# Patient Record
Sex: MALE | Race: ASIAN | NOT HISPANIC OR LATINO | URBAN - METROPOLITAN AREA
[De-identification: names, ages, dates, MRNs, and addresses within clinical notes are randomized per-mention and may not be internally consistent; named-entity substitution may affect disease eponyms.]

---
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
## 2021-11-19 ENCOUNTER — EMERGENCY (EMERGENCY)
Facility: HOSPITAL | Age: 21
LOS: 1 days | Discharge: HOME | End: 2021-11-19
Attending: EMERGENCY MEDICINE | Admitting: EMERGENCY MEDICINE
Payer: MEDICAID

## 2021-11-19 VITALS
TEMPERATURE: 99 F | OXYGEN SATURATION: 100 % | RESPIRATION RATE: 18 BRPM | DIASTOLIC BLOOD PRESSURE: 93 MMHG | HEART RATE: 107 BPM | SYSTOLIC BLOOD PRESSURE: 148 MMHG

## 2021-11-19 VITALS
OXYGEN SATURATION: 100 % | HEART RATE: 83 BPM | DIASTOLIC BLOOD PRESSURE: 69 MMHG | TEMPERATURE: 98 F | SYSTOLIC BLOOD PRESSURE: 125 MMHG | RESPIRATION RATE: 17 BRPM

## 2021-11-19 PROCEDURE — 93010 ELECTROCARDIOGRAM REPORT: CPT

## 2021-11-19 PROCEDURE — 99285 EMERGENCY DEPT VISIT HI MDM: CPT | Mod: 25

## 2021-11-19 PROCEDURE — 71046 X-RAY EXAM CHEST 2 VIEWS: CPT | Mod: 26

## 2021-11-19 RX ORDER — IBUPROFEN 200 MG
600 TABLET ORAL ONCE
Refills: 0 | Status: COMPLETED | OUTPATIENT
Start: 2021-11-19 | End: 2021-11-19

## 2021-11-19 RX ADMIN — Medication 600 MILLIGRAM(S): at 23:27

## 2021-11-19 NOTE — ED ADULT TRIAGE NOTE - NS ED NURSE DIRECT TO ROOM YN
Aleksander Castillo, PT  LUISITO Gutierrez  Nurse Msg Pool             PT discipline discharge complete and patient in agreement with current POC.     Patient demonstrates good progress toward therapy goals.  At initial evaluation, patient was CGA to SBA for nearly all functional mobility with a FWW.  His movements were very slow, contributing to increased fall risk.  Currently, he is SBA to supervision for all functional mobility with intermittent use of AD.  He is able to walk short distances within the home without an AD and no evidence of balance loss.  He uses a FWW when getting out to the car or ambulating outdoors.  His movements are a slightly more pronounced with an improvement in TUG score of 12 seconds.  His Tinetti balance score improved by 7 points, indicating significant improvement in overall balance.  He does still remain a mild fall risk, especially when using stairs or ambulating on outdoor surfaces.  He is SBA to go up and down 4 steps with help from his spouse.  He denies any pain and feels though he has made good progress overall.  Patient will be D/C from skilled  PT services at this time and will continue with a comprehensive HEP to prevent a decline in function.      Routed to PCP as an FYI    No

## 2021-11-19 NOTE — ED PROVIDER NOTE - NSFOLLOWUPINSTRUCTIONS_ED_ALL_ED_FT
Use tylenol 650mg and mortin 600mg every 6 hours as needed for pain.    Follow up with your cardiologist.     Chest pain can be caused by many different conditions which may or may not be dangerous. Causes include heartburn, lung infections, heart attack, blood clot in lungs, skin infections, strain or damage to muscle, cartilage, or bones, etc. In addition to a history and physical examination, an electrocardiogram (ECG) or other lab tests may have been performed to determine the cause of your chest pain. Follow up with your primary care provider or with a cardiologist as instructed.     SEEK IMMEDIATE MEDICAL CARE IF YOU HAVE ANY OF THE FOLLOWING SYMPTOMS: worsening chest pain, coughing up blood, unexplained back/neck/jaw pain, severe abdominal pain, dizziness or lightheadedness, fainting, shortness of breath, sweaty or clammy skin, vomiting, or racing heart beat. These symptoms may represent a serious problem that is an emergency. Do not wait to see if the symptoms will go away. Get medical help right away. Call 911 and do not drive yourself to the hospital.

## 2021-11-19 NOTE — ED PROVIDER NOTE - ATTENDING CONTRIBUTION TO CARE
Attending Statement: I have personally seen and examined this patient. I have fully participated in the care of this patient. I have reviewed all pertinent clinical information, including history physical exam, plan and the Resident's note and agree except as noted  20yo M hx of PVC followed by cardiology pw chest pain x 3 days. Intermittent left sided chest pain, feels like prior PVC. Today felt "weirder" no associated n/v/d no SOB or NOYOLA no cough no fever. no abdominal pain. no leg swelling. non smoker no EOTH or drug use. no travel. Had an echo/stress and holter w cardiology a couple of months ago, dx w pvc.   Vital signs noted. well appearing male. normal S1-S2 No resp distress. able to speak in full and clear sentences. no wheeze, rales or stridor. soft nontender abdomen. no  rebound. no guarding. no sign of trauma. no CVAT no pedal edema. no calf tenderness. normal pulses bilateral feet.  plan labs, ekg, cxr, tele monitor.  PERC neg

## 2021-11-19 NOTE — ED ADULT TRIAGE NOTE - CHIEF COMPLAINT QUOTE
chest pain    epigastric pain for 2-3 day with dizziness, clammy hands.  pt has hx of pvc's.. on diltiazem.  gf- marysol [phone]

## 2021-11-19 NOTE — ED ADULT NURSE NOTE - OBJECTIVE STATEMENT
pt received to room 2 , a&ox 4 and ambulatory, pmh of PVCs on Diltiazem, p/w chest pain radiating to left arm since Wednesday. Chest pain non-constant per patient. Pain not relieved with meds. Pt verbalized epigastric pain. Abdomen soft, non-tender, non-distended with positive bowel sounds. Pt breathing even and unlabored on room air. NSR on cardiac monitor. Denies fever, chills, cough, SOB, palpitations, dizziness, N/V/D, constipation, numbness, tingling. MD orders pending. Stretcher in lowest position, wheels locked, appropriate side rails in place, call bell in reach.

## 2021-11-19 NOTE — ED PROVIDER NOTE - PHYSICAL EXAMINATION
GENERAL: Awake, alert, NAD, +obese  HEENT: NC/AT, moist mucous membranes  LUNGS: CTAB, no wheezes or crackles   CARDIAC: RRR, no m/r/g  ABDOMEN: Soft, normal BS, non tender, non distended, no rebound, no guarding  BACK: No midline spinal tenderness, no CVA tenderness  EXT: No edema, no calf tenderness, 2+ DP pulses bilaterally, no deformities.  NEURO: A&Ox3. Moving all extremities.  SKIN: Warm and dry. No rash.  PSYCH: Normal affect.

## 2021-11-19 NOTE — ED PROVIDER NOTE - NS ED ROS FT
CONST: no fevers, no chills  EYES: no pain, no vision changes  ENT: no sore throat, no ear pain, no change in hearing  CV: + chest pain, no leg swelling  RESP: no shortness of breath, no cough  ABD: no abdominal pain, no nausea, no vomiting, no diarrhea  : no dysuria, no flank pain, no hematuria  MSK: no back pain, + extremity pain  NEURO: no headache or additional neurologic complaints  HEME: no easy bleeding  SKIN:  no rash

## 2021-11-19 NOTE — ED PROVIDER NOTE - OBJECTIVE STATEMENT
20 y/o M with PMH PVCs (on diltiazem, follows with cardiology) presenting to the ED c/o chest pain. Reports intermittent for the past 2-3 days but was more consistent today with radiation into the left arm. He endorses having chest pain in the past, but states today felt different given the radiation and also feeling lightheaded. He denies SOB. No nausea, vomiting, fever, or chills. No family hx of heart disease or early death. Denies leg swelling, recent travel, immobility.

## 2021-11-19 NOTE — ED PROVIDER NOTE - PATIENT PORTAL LINK FT
You can access the FollowMyHealth Patient Portal offered by [facility] by registering at the following website: [email or web address]. By joining [employer]’s FollowMyHealth portal, you will also be able to view your health information using other applications (apps) compatible with our system.

## 2021-11-19 NOTE — ED ADULT NURSE NOTE - CHIEF COMPLAINT QUOTE
chest pain    epigastric pain for 2-3 day with dizziness, clammy hands.  pt has hx of pvc's.. on diltiazem.  gf- marysol [phone]

## 2021-11-19 NOTE — ED PROVIDER NOTE - CLINICAL SUMMARY MEDICAL DECISION MAKING FREE TEXT BOX
22 y/o M with PMH PVCs presenting to the ED w/ chest pain. Vitals stable upon arrival. Patient is well appearing in NAD. Given age and limited risk factors, low suspicion for ACS. However, patient with known PVCs/cardiac pathology so will obtain labs including cbc, cmp, trop, CXR, EKG. Low suspicion of PE given no risk factors and normal vitals. Dylan Knox, DO PGY3

## 2021-11-19 NOTE — ED PROVIDER NOTE - PROGRESS NOTE DETAILS
pending labs if neg dc w close cardiology follow up Labs and CXR within normal limits. Patient instructed to follow up with his cardiologist this week. Dylan Knox, DO PGY3

## 2021-11-20 LAB
ALBUMIN SERPL ELPH-MCNC: 4.6 G/DL — SIGNIFICANT CHANGE UP (ref 3.3–5)
ALP SERPL-CCNC: 91 U/L — SIGNIFICANT CHANGE UP (ref 40–120)
ALT FLD-CCNC: 24 U/L — SIGNIFICANT CHANGE UP (ref 4–41)
ANION GAP SERPL CALC-SCNC: 12 MMOL/L — SIGNIFICANT CHANGE UP (ref 7–14)
APTT BLD: 33.5 SEC — SIGNIFICANT CHANGE UP (ref 27–36.3)
AST SERPL-CCNC: 18 U/L — SIGNIFICANT CHANGE UP (ref 4–40)
BASOPHILS # BLD AUTO: 0.02 K/UL — SIGNIFICANT CHANGE UP (ref 0–0.2)
BASOPHILS NFR BLD AUTO: 0.2 % — SIGNIFICANT CHANGE UP (ref 0–2)
BILIRUB SERPL-MCNC: 0.3 MG/DL — SIGNIFICANT CHANGE UP (ref 0.2–1.2)
BUN SERPL-MCNC: 9 MG/DL — SIGNIFICANT CHANGE UP (ref 7–23)
CALCIUM SERPL-MCNC: 9.7 MG/DL — SIGNIFICANT CHANGE UP (ref 8.4–10.5)
CHLORIDE SERPL-SCNC: 103 MMOL/L — SIGNIFICANT CHANGE UP (ref 98–107)
CO2 SERPL-SCNC: 26 MMOL/L — SIGNIFICANT CHANGE UP (ref 22–31)
CREAT SERPL-MCNC: 0.8 MG/DL — SIGNIFICANT CHANGE UP (ref 0.5–1.3)
EOSINOPHIL # BLD AUTO: 0.18 K/UL — SIGNIFICANT CHANGE UP (ref 0–0.5)
EOSINOPHIL NFR BLD AUTO: 2.1 % — SIGNIFICANT CHANGE UP (ref 0–6)
GLUCOSE SERPL-MCNC: 104 MG/DL — HIGH (ref 70–99)
HCT VFR BLD CALC: 44.9 % — SIGNIFICANT CHANGE UP (ref 39–50)
HGB BLD-MCNC: 14.5 G/DL — SIGNIFICANT CHANGE UP (ref 13–17)
IANC: 5.49 K/UL — SIGNIFICANT CHANGE UP (ref 1.5–8.5)
IMM GRANULOCYTES NFR BLD AUTO: 0.2 % — SIGNIFICANT CHANGE UP (ref 0–1.5)
INR BLD: 1.12 RATIO — SIGNIFICANT CHANGE UP (ref 0.88–1.16)
LYMPHOCYTES # BLD AUTO: 2.03 K/UL — SIGNIFICANT CHANGE UP (ref 1–3.3)
LYMPHOCYTES # BLD AUTO: 23.5 % — SIGNIFICANT CHANGE UP (ref 13–44)
MCHC RBC-ENTMCNC: 25.8 PG — LOW (ref 27–34)
MCHC RBC-ENTMCNC: 32.3 GM/DL — SIGNIFICANT CHANGE UP (ref 32–36)
MCV RBC AUTO: 79.9 FL — LOW (ref 80–100)
MONOCYTES # BLD AUTO: 0.89 K/UL — SIGNIFICANT CHANGE UP (ref 0–0.9)
MONOCYTES NFR BLD AUTO: 10.3 % — SIGNIFICANT CHANGE UP (ref 2–14)
NEUTROPHILS # BLD AUTO: 5.49 K/UL — SIGNIFICANT CHANGE UP (ref 1.8–7.4)
NEUTROPHILS NFR BLD AUTO: 63.7 % — SIGNIFICANT CHANGE UP (ref 43–77)
NRBC # BLD: 0 /100 WBCS — SIGNIFICANT CHANGE UP
NRBC # FLD: 0 K/UL — SIGNIFICANT CHANGE UP
PLATELET # BLD AUTO: 229 K/UL — SIGNIFICANT CHANGE UP (ref 150–400)
POTASSIUM SERPL-MCNC: 3.8 MMOL/L — SIGNIFICANT CHANGE UP (ref 3.5–5.3)
POTASSIUM SERPL-SCNC: 3.8 MMOL/L — SIGNIFICANT CHANGE UP (ref 3.5–5.3)
PROT SERPL-MCNC: 7.9 G/DL — SIGNIFICANT CHANGE UP (ref 6–8.3)
PROTHROM AB SERPL-ACNC: 12.8 SEC — SIGNIFICANT CHANGE UP (ref 10.6–13.6)
RBC # BLD: 5.62 M/UL — SIGNIFICANT CHANGE UP (ref 4.2–5.8)
RBC # FLD: 12.4 % — SIGNIFICANT CHANGE UP (ref 10.3–14.5)
SODIUM SERPL-SCNC: 141 MMOL/L — SIGNIFICANT CHANGE UP (ref 135–145)
TROPONIN T, HIGH SENSITIVITY RESULT: <6 NG/L — SIGNIFICANT CHANGE UP
WBC # BLD: 8.63 K/UL — SIGNIFICANT CHANGE UP (ref 3.8–10.5)
WBC # FLD AUTO: 8.63 K/UL — SIGNIFICANT CHANGE UP (ref 3.8–10.5)

## 2021-11-20 RX ADMIN — Medication 600 MILLIGRAM(S): at 00:15

## 2022-08-01 ENCOUNTER — APPOINTMENT (OUTPATIENT)
Dept: PULMONOLOGY | Facility: CLINIC | Age: 22
End: 2022-08-01

## 2022-08-01 VITALS
BODY MASS INDEX: 37.92 KG/M2 | RESPIRATION RATE: 16 BRPM | SYSTOLIC BLOOD PRESSURE: 120 MMHG | WEIGHT: 305 LBS | HEART RATE: 75 BPM | TEMPERATURE: 98 F | HEIGHT: 75 IN | OXYGEN SATURATION: 99 % | DIASTOLIC BLOOD PRESSURE: 82 MMHG

## 2022-08-01 DIAGNOSIS — R06.83 SNORING: ICD-10-CM

## 2022-08-01 DIAGNOSIS — E66.9 OBESITY, UNSPECIFIED: ICD-10-CM

## 2022-08-01 PROCEDURE — 99203 OFFICE O/P NEW LOW 30 MIN: CPT

## 2022-08-01 RX ORDER — DILTIAZEM HYDROCHLORIDE 180 MG/1
180 CAPSULE, EXTENDED RELEASE ORAL
Qty: 30 | Refills: 0 | Status: ACTIVE | COMMUNITY
Start: 2022-05-19

## 2022-08-01 RX ORDER — FAMOTIDINE 40 MG/1
40 TABLET, FILM COATED ORAL
Qty: 30 | Refills: 0 | Status: DISCONTINUED | COMMUNITY
Start: 2022-03-01 | End: 2022-08-01

## 2022-08-01 RX ORDER — COVID-19 ANTIGEN TEST
KIT MISCELLANEOUS
Qty: 2 | Refills: 0 | Status: ACTIVE | COMMUNITY
Start: 2022-05-20

## 2022-08-01 RX ORDER — ONDANSETRON 4 MG/1
4 TABLET ORAL
Qty: 20 | Refills: 0 | Status: DISCONTINUED | COMMUNITY
Start: 2022-03-01 | End: 2022-08-01

## 2022-08-01 NOTE — ASSESSMENT
[FreeTextEntry1] : This is a 22 year old male with a history of mild obstructive sleep apnea here to reestablish care to be requalified for CPAP machine.. The patient has multiple signs and symptoms of sleep-disordered breathing include loud snoring and witnessed apneas in addition to morning headaches and daytime vertigo.. He was referred to the [facility] for a diagnostic HSAT. The ramifications of HAKAN and its potential therapeutic modalities were discussed with the patient. The patient was cautioned on the importance of avoiding drowsy driving. He will follow up with us after the HSAT.\par \par Was a patient is diagnosed with obstructive sleep apnea we will offer him either a home APAP study or order him just an APAP machine with a follow-up mask fitting.

## 2022-08-01 NOTE — PHYSICAL EXAM
[No Acute Distress] : no acute distress [Well Nourished] : well nourished [No Deformities] : no deformities [IV] : Mallampati Class: IV [2+] : Right Tonsil: 2+ [Normal Appearance] : normal appearance [Supple] : supple [No Neck Mass] : no neck mass [Normal Rate/Rhythm] : normal rate/rhythm [Normal S1, S2] : normal s1, s2 [No Murmurs] : no murmurs [No Resp Distress] : no resp distress [Clear to Auscultation Bilaterally] : clear to auscultation bilaterally [No Abnormalities] : no abnormalities [No Clubbing] : no clubbing [No Cyanosis] : no cyanosis [No Edema] : no edema [No Focal Deficits] : no focal deficits [Oriented x3] : oriented x3 [Normal Affect] : normal affect

## 2022-08-01 NOTE — HISTORY OF PRESENT ILLNESS
[Never] : never [Obstructive Sleep Apnea] : obstructive sleep apnea [TextBox_4] : This is a 22-year-old male with significant past medical history of sleep apnea and obesity who comes in to reestablish care with me.\par \par The patient was last seen in 2019 at this time for diagnosis of sleep apnea.  The patient was indicating that he was found to have mild obstructive sleep apnea and was given a CPAP machine.  He was unable to tolerate the CPAP machine due to a poor mask fitting.  However, he did not meet compliance requirements and his machine was to be taken away from him.  He received a DreamStation 1 and then it went under recall.  The patient was given a refurbished machine but he sent both machines back.  He continues to have loud snoring and witnessed apneas.  He continues to have morning headaches and daytime dizziness.  He is interested in requalifying for a new CPAP machine.\par \par ______________________________________________________________________________________ \par EPWORTH SLEEPINESS SCALE \par How likely are you to doze off or fall asleep in the situations described below, in contrast to feeling just tired? \par This refers to your usual way of life in recent times. \par Even if you haven't done some of these things recently, try to work out how they would have affected you. \par Use the following scale to choose one most appropriate number for each situation. \par \par 0 = Never would doze \par 1 = Slight chance of dozing \par 2 = Moderate chance of dozing \par 3 = High chance of dozing \par \par  \par 0........................................Sitting and reading \par 0........................................Watching TV \par 0........................................Sitting inactive in a public place (eg a theatre or a meeting) \par 0........................................As a passenger in a car for an hour without a break \par 0........................................Lying down to rest in the afternoon when circumstances permit \par 0........................................Sitting and talking to someone \par 0........................................Sitting quietly after lunch without alcohol \par 0........................................In a car, while stopped for a few minutes in traffic \par 0........................................TOTAL SCORE \par ______________________________________________________________________________________ \par \par  [TextBox_77] : 10pm [TextBox_79] : 8am [TextBox_81] : 20-30 min [TextBox_85] : 8 hours [TextBox_89] : 0-1 [ESS] : 0

## 2022-08-26 NOTE — ED PROVIDER NOTE - MEDICAL DECISION MAKING DETAILS
Sharona PGY2: 18M hx of prior PVCs last stress x3 years ago, no current cardiologist presents with a cc of L sided chest pain a/w radiation to L neck and jaw has had chest pain for months usually bearable however today noted worsening SP with SOB NOYOLA worse with exertion prompting ED visit, currently having CP in ED exam vss well appearing non toxic RBBB w/ PVC on ekg low c/f acs will rule out labs cxr cardiacs likely cdu for stress/echo
Spontaneous, unlabored and symmetrical

## 2022-09-06 ENCOUNTER — APPOINTMENT (OUTPATIENT)
Dept: SLEEP CENTER | Facility: CLINIC | Age: 22
End: 2022-09-06

## 2022-10-31 ENCOUNTER — OUTPATIENT (OUTPATIENT)
Dept: OUTPATIENT SERVICES | Facility: HOSPITAL | Age: 22
LOS: 1 days | End: 2022-10-31
Payer: MEDICAID

## 2022-10-31 ENCOUNTER — APPOINTMENT (OUTPATIENT)
Dept: SLEEP CENTER | Facility: CLINIC | Age: 22
End: 2022-10-31

## 2022-10-31 PROCEDURE — 95806 SLEEP STUDY UNATT&RESP EFFT: CPT | Mod: 26

## 2022-10-31 PROCEDURE — 95806 SLEEP STUDY UNATT&RESP EFFT: CPT

## 2022-11-11 ENCOUNTER — APPOINTMENT (OUTPATIENT)
Dept: PULMONOLOGY | Facility: CLINIC | Age: 22
End: 2022-11-11

## 2022-11-11 DIAGNOSIS — G47.33 OBSTRUCTIVE SLEEP APNEA (ADULT) (PEDIATRIC): ICD-10-CM

## 2022-11-11 PROCEDURE — 99213 OFFICE O/P EST LOW 20 MIN: CPT | Mod: 95

## 2022-11-14 PROBLEM — G47.33 OBSTRUCTIVE SLEEP APNEA, ADULT: Status: ACTIVE | Noted: 2022-08-01

## 2022-11-14 NOTE — PHYSICAL EXAM
[IV] : Mallampati Class: IV [2+] : Right Tonsil: 2+ [Normal Appearance] : normal appearance [Supple] : supple [No Neck Mass] : no neck mass [Normal Rate/Rhythm] : normal rate/rhythm [Normal S1, S2] : normal s1, s2 [No Murmurs] : no murmurs [No Resp Distress] : no resp distress [Clear to Auscultation Bilaterally] : clear to auscultation bilaterally [No Abnormalities] : no abnormalities [No Clubbing] : no clubbing [No Cyanosis] : no cyanosis [No Edema] : no edema [No Focal Deficits] : no focal deficits [No Acute Distress] : no acute distress [Well Nourished] : well nourished [No Deformities] : no deformities [Oriented x3] : oriented x3 [Normal Mood] : normal mood [Normal Affect] : normal affect

## 2022-11-14 NOTE — ASSESSMENT
[FreeTextEntry1] : This is a 22 year old male with a history of mild obstructive sleep apnea here to reestablish care to be re qualified for CPAP machine. \par \par #Obstructive sleep apnea–patient recently underwent a home diagnostic sleep study and was found to have no significant sleep disordered breathing with an AHI of 1.8.  Patient was having difficulty tolerating using PAP device previously.  Given that he had mild obstructive sleep apnea in the past and currently does not register as having any obstructive sleep apnea we will hold off on any further treatment at this time.  Note the patient's sleep disordered breathing symptoms become worse can always retest him in an in lab polysomnography to determine the true value of sleep disordered breathing.\par \par Patient will follow up with me as needed.

## 2022-11-14 NOTE — HISTORY OF PRESENT ILLNESS
[Never] : never [Obstructive Sleep Apnea] : obstructive sleep apnea [Home] : at home, [unfilled] , at the time of the visit. [Medical Office: ([facility])___] : at the medical office located in  [Verbal consent obtained from patient] : the patient, [unfilled] [TextBox_4] : \par \par This is a 22-year-old male with significant past medical history of sleep apnea and obesity who comes in for follow-up.  He underwent a home diagnostic sleep study and is here to review his results.\par \par Of note:\par The patient was last seen in 2019 at this time for diagnosis of sleep apnea.  The patient was indicating that he was found to have mild obstructive sleep apnea and was given a CPAP machine.  He was unable to tolerate the CPAP machine due to a poor mask fitting.  However, he did not meet compliance requirements and his machine was to be taken away from him.  He received a DreamStation 1 and then it went under recall.  The patient was given a refurbished machine but he sent both machines back.  He continues to have loud snoring and witnessed apneas.  He continues to have morning headaches and daytime dizziness.  He is interested in requalifying for a new CPAP machine.\par \par ______________________________________________________________________________________ \par EPWORTH SLEEPINESS SCALE \par How likely are you to doze off or fall asleep in the situations described below, in contrast to feeling just tired? \par This refers to your usual way of life in recent times. \par Even if you haven't done some of these things recently, try to work out how they would have affected you. \par Use the following scale to choose one most appropriate number for each situation. \par \par 0 = Never would doze \par 1 = Slight chance of dozing \par 2 = Moderate chance of dozing \par 3 = High chance of dozing \par \par  \par 0........................................Sitting and reading \par 0........................................Watching TV \par 0........................................Sitting inactive in a public place (eg a theatre or a meeting) \par 0........................................As a passenger in a car for an hour without a break \par 0........................................Lying down to rest in the afternoon when circumstances permit \par 0........................................Sitting and talking to someone \par 0........................................Sitting quietly after lunch without alcohol \par 0........................................In a car, while stopped for a few minutes in traffic \par 0........................................TOTAL SCORE \par ______________________________________________________________________________________ \par \par  [TextBox_77] : 10pm [TextBox_79] : 8am [TextBox_81] : 20-30 min [TextBox_85] : 8 hours [TextBox_89] : 0-1 [ESS] : 0

## 2022-12-02 DIAGNOSIS — G47.33 OBSTRUCTIVE SLEEP APNEA (ADULT) (PEDIATRIC): ICD-10-CM

## 2023-05-11 ENCOUNTER — EMERGENCY (EMERGENCY)
Facility: HOSPITAL | Age: 23
LOS: 1 days | Discharge: HOME | End: 2023-05-11
Attending: EMERGENCY MEDICINE | Admitting: EMERGENCY MEDICINE
Payer: MEDICAID

## 2023-05-11 VITALS
HEART RATE: 80 BPM | OXYGEN SATURATION: 100 % | SYSTOLIC BLOOD PRESSURE: 146 MMHG | DIASTOLIC BLOOD PRESSURE: 100 MMHG | RESPIRATION RATE: 18 BRPM | TEMPERATURE: 98 F

## 2023-05-11 LAB
FLUAV AG NPH QL: SIGNIFICANT CHANGE UP
FLUBV AG NPH QL: SIGNIFICANT CHANGE UP
RSV RNA NPH QL NAA+NON-PROBE: SIGNIFICANT CHANGE UP
SARS-COV-2 RNA SPEC QL NAA+PROBE: SIGNIFICANT CHANGE UP

## 2023-05-11 PROCEDURE — 99284 EMERGENCY DEPT VISIT MOD MDM: CPT

## 2023-05-11 PROCEDURE — 93010 ELECTROCARDIOGRAM REPORT: CPT

## 2023-05-11 RX ORDER — HYDROCODONE BITARTRATE AND HOMATROPINE METHYLBROMIDE 5; 1.5 MG/5ML; MG/5ML
5 SOLUTION ORAL
Qty: 140 | Refills: 0
Start: 2023-05-11 | End: 2023-05-17

## 2023-05-11 RX ORDER — HYDROCODONE BITARTRATE AND HOMATROPINE METHYLBROMIDE 5; 1.5 MG/5ML; MG/5ML
5 SOLUTION ORAL
Qty: 140 | Refills: 0
Start: 2023-05-11 | End: 2023-05-19

## 2023-05-11 NOTE — ED PROVIDER NOTE - NS ED MD DISPO DISCHARGE
Patient:   CRISPIN MCGRATH            MRN: [id number]            FIN: [id number]              Age:   72 years     Sex:  MALE     :  47   Associated Diagnoses:   None   Author:   GILBERTO MARKS     Chief Complaint   nsvt     History of Present Illness   The patient is a 72 year old male with past medical history of CAD s/p CABG, COPD on 5L NC, Diabetes, BPH, Atrial fibrillation, hx of surgical atrial appendage ligation, PVD, HTN and HLD  was admitted due to worsening drainage from the lumbar incision.  Patient was admitted in April with COVID pneumonia and then readmitted in May with dysuria, at that time found to have bacteremia growing bacteroides fragilis and with associated back pain and lower extremity weakness found to have L4-L5 discitis/osteomyelitis with associated epidural abscess. Underwent hemilaminotomy, medial facetectomy, diskectomy and evacuation of epidural abscess and disk space on 20. Patient was discharged to [facility] on   on IV abx through PICC line on presently receiving meropenem and vancomycin. Per the wife was notified yesterday by the rehab facility about increased drainage from the surgical site and therefore patient was transferred to the hospital. Overnight, was noted to have a brief episode of NSVT and cardiology was consulted. Patient denies palpitations or chest pain.      Review of Systems   Constitutional:  Negative.    Endocrine:  Negative.    Eye:  Negative.    Ear/Nose/Mouth/Throat:  Negative.    Respiratory:  Negative.    Cardiovascular:  Negative except as documented in history of present illness.   Gastrointestinal:  Negative.    Genitourinary:  Negative.    Hematology/Lymphatics:  Negative.    Musculoskeletal:  Negative.    Integumentary:  Negative except as documented in history of present illness.   Neurologic:  Negative.      Histories   Past Med History: Arthritis  Atrial fibrillation  CHF - Congestive heart failure  CKD (chronic kidney disease)  COPD  (Chronic Obstructive Pulmonary Disease) Assessment Test scale  COPD - Chronic obstructive pulmonary disease  Chronic pain  Congestive heart failure (CHF)  Diabetes  Diabetes mellitus  Hearing problem  Hearing problem  Hyperlipidemia  Hypertension  Hypertension  PVD (peripheral vascular disease)  Risk factors for obstructive sleep apnea  Smoker      Family History: MOTHER: Cataract      Procedure History:    Amputation of toe and metatarsal (SNOMED CT 2012334435).  Appendectomy (SNOMED CT 850117255).  CABG x 3 - Coronary artery bypass grafts x 3 (SNOMED CT 841647201).  Social History       Alcohol  Details: Alcohol Abuse in Household: No.  Use: None.  Details: Use: None.  Exercise  Details: Exercise: Never.  Details: Excercise: Never.  Home/Environment  Details: Alcohol Abuse in Household: No.  Substance Abuse in Household: No.  Smoker in Household: Yes.  Patient Lives With: Spouse.  Living Situation: Lives With Spouse.  Ambulation: Required Assistance.  Bathing: Required Assistance.  Dressing: Independent.  Driving: Not Performed.  Eating: Independent.  Elimination: Independent.  Grooming: Independent.  Preparing Meal: Independent.  Taking Meds: Independent.  Toileting: Independent.   Transfers: Independent.  Assistive Devices Home: Walker.  Rehab Consulted No.  Substance Abuse  Details: Substance Abuse in Household: No.  Use: None.  Details: Use: None.  Tobacco  Details: Smoker in Houshold: No.  Smoked/Smokeless Tobacco Last 30 Days: No.  Smoking Tobacco Use: Former smoker.  Smokeless Tobacco Use Former smokeless tobacco user, quit more than 30 days ago.  Details: Smoked/Smokeless Tobacco Last 30 Days: Yes.  Use: Current every day smoker.  Type: Cigarettes.  Cigarette Packs/Day: 5 or More Cigarettes <1 Pack Per Day.  Cultural/Moravian Practices  Details: Moravian or Cultural Practices: None.  Details: Moravian or Cultural Practices While in Hospital: No.  .       Health Status   Allergies: Allergies (ST)    Allergies (1) Active Reaction  Glimepiride-Pioglitazone None Documented  Hydrochloride    Current medications:    Medications (27) Active  Scheduled: (12)  Albuterol HFA 90 mcg oral MDI 8 gm  180 mcg 2 puff, MDI/DPI, Q4H Awake x4  Ascorbic acid 500 mg tab  500 mg 1 tab, Oral, Daily  Aspirin 81 mg DR tab  81 mg 1 tab, Oral, Daily  Ergocalciferol 50,000 unit (1.25 mg) cap  50,000 unit 1 cap, Oral, Q Thursday  Finasteride 5 mg tab  5 mg 1 tab, Oral, Daily  Heparin 5,000 unit/1 mL inj MDV  5,000 unit 1 mL, Subcutaneous, Q8H  Insulin human lispro 1 unit/0.01 mL inj  1-6 units, Subcutaneous, QID [with meals & HS]  meropenem [RESTRICTED]  500 mg, IVPB, Q6H  Pravastatin 40 mg tab  40 mg 1 tab, Oral, Q Bedtime  Sodium chloride PF 0.9% flush inj 10 mL  10 mL, Flush, Q12H  Thiamine 100 mg tab  100 mg 1 tab, Oral, Daily  Vancomycin 1,000 mg inj  Pharmacy serial dosing, N/A, As Directed PRN  Continuous: (0)  PRN: (15)  Acetaminophen 325 mg tab  650 mg 2 tab, Oral, Q4H  Acetaminophen 500 mg tab  500 mg 1 tab, Oral, Q4H  Albuterol HFA 90 mcg oral MDI 8 gm  180 mcg 2 puff, MDI/DPI, Q6H  Dextrose (glucose) 40% 15 gm/37.5 gm oral gel UD  15 gm, Oral, As Directed PRN  Dextrose (glucose) 50% 25 gm/50 mL syringe  12.5 gm 25 mL, IV Push, As Directed PRN  Dicyclomine 10 mg cap  10 mg 1 cap, Oral, QID [before meals & HS]  Glucagon 1 mg/1 mL emergency kit SDV  1 mg 1 mL, IM, As Directed PRN  LORazepam 1 mg tab  1 mg 1 tab, Oral, On Call  Melatonin 3 mg tab  3 mg 1 tab, Oral, Q Bedtime  Ondansetron 4 mg/2 mL inj SDV  4 mg 2 mL, Slow IV Push, Q6H  OxyCODONE 5 mg IR tab  5 mg 1 tab, Oral, Q4H  Senna-docusate sodium 8.6-50 mg tab  1 tab, Oral, Q Bedtime  Simethicone 125 mg chew tab  125 mg 1 tab, Chewed, TID [with meals]  Sodium chloride PF 0.9% flush inj 10 mL  10 mL, Flush, As Directed PRN  Sodium chloride PF 0.9% flush inj 10 mL  20 mL, Flush, As Directed PRN      Physical Examination   VS/Measurements     Vitals between:   20-JUN-2020  12:50:38   TO   21-JUN-2020 12:50:38                   LAST RESULT MINIMUM MAXIMUM  Temperature 36.6 36.4 36.6  Heart Rate 59 49 59  Respiratory Rate 18 16 18  NISBP           133 94 133  NIDBP           67 49 67  SpO2                    93 93 95    General:  Alert and oriented, No acute distress.    Eye:  Extraocular movements are intact.    HENT:  No pharyngeal erythema, hard of hearing.    Neck:  Supple, No jugular venous distention, No lymphadenopathy.   Respiratory:  Lungs are clear to auscultation.    Cardiovascular:  Normal rate, Irregularly irregular rhythm, No murmur, No edema.   Gastrointestinal:  Soft, Non-tender, Non-distended, Normal bowel sounds.   Musculoskeletal:  No tenderness.    Integumentary:  Warm, Dry.    Neurologic:  No focal deficits.      Review / Management   Laboratory results:     Labs between:  20-JUN-2020 12:50 to 21-JUN-2020 12:50  CBC:                 WBC  HgB  Hct  Plt  MCV  RDW   21-JUN-2020 10.8  (L) 8.0  (L) 26.2  174  98.9  (H) 19.8   DIFF:                 Seg  Neutroph//ABS  Lymph//ABS  Mono//ABS  EOS/ABS  21-JUN-2020 NOT APPLICABLE  81 // (H) 8.8  6 // (L) 0.6  8 // 0.8 3 // 0.3  BMP:                 Na  Cl  BUN  Glu   21-JUN-2020 142  106  14  96                              K  CO2  Cr  Ca                              3.9  (H) 33  0.72  (L) 7.7   CMP:                 AST  ALT  AlkPhos  Bili  Albumin   21-JUN-2020 13  11  110  (H) 1.3  (L) 1.8   Other Chem:             Mg  Phos  Triglycerides  GGTP  DirectBili                           2.0           POC GLU:                 Latest Result  Latest Date  Minimum  Min Date  Maximum  Max Date                             96 21-JUN-2020 85 21-JUN-2020 (H) 101  20-JUN-2020  Drug Levels:                           Vancomycin                   Trough: (!) 25.5                               ,   Cardiovascular Labs    NT proBNP 8877 pg/mL (06/20/20 05:30)    .    Radiology results   chest xray 6/19:  Impression:  Right PICC line tip  terminates at the cavoatrial junction.  Findings suggestive of pulmonary edema.  Superimposed infection is not excluded, particular in the left lower lobe there is a more focal opacity.  Result title:  MRI LUMBAR SPINE WO CON  Result status:  Final  Verified by:  RICARDO EM on 06/21/2020 07:13  IMPRESSION:1.  Limited evaluation, as study was terminated prematurely due to patient pain and anxiety.2.  Fluid signal intensity in the L2-L3 intervertebral disc space with adjacent irregularity of the inferior L2 and superior L3 endplates may reflect discitis osteomyelitis.3.  Fluid signal intensity distending the L4-L5 intervertebral disc space up to 2.5 cm is worrisome for intervertebral disc abscess.  This appears to extend into the left  L4-L5 disc facet joint and is contiguous with posterior paraspinal edema.  echo 6/16:  1. Left ventricle: The cavity size is mildly dilated. Wall thickness is    mildly increased. Systolic function is normal. The estimated ejection    fraction is 55-60%.  2. Right ventricle: The cavity size is mildly to moderately dilated. Wall    thickness is normal. Systolic pressure is moderately to markedly    increased. The estimated peak pressure is 68mm Hg.   ,       ECG interp:  (visualized and independently interpreted): afib, 63 bpm, IVCD.   Documentation reviewed:  Reviewed prior records.      Impression and Plan   nsvt - patient with very short run of nsvt, asymptomatic  - suspect it may have been precipitated by beta agonist bronchodilators  - recent echow ith normal ef  - patient with known CAD, hx of cabg  - unable to tolerate beta blockers due to copd and bradycardia  - consider switching bronchodilators to PRN if possible  - maintain K > 4.0, Mg > 2.0 with diuresis  acute on chronic diastolic heart failure - bnp markedly elevated, pulmonary edema noted on chest xray  - start lasix 40 mg iv bid  - monitor intake/output  - closely monitor renal function, electrolytes with  diuresis  permanent afib - rate controlled  - has been on warfarin in the past. should restart if no contraindication  cad - hx of cabg  - continue asa, statin  - no beta blocker due to copd and bradycardia  Deandre Kay MD   Home

## 2023-05-11 NOTE — ED ADULT NURSE NOTE - OBJECTIVE STATEMENT
Pt c/o sore throat, cough and chest pain after coughing X 3 days. Diagnosed with tonsilitis on 5/8, started on Amoxicillin. Denies chest pain, sob, abd pain, n/v/d, fevers/chills. Pmhx: PVC's, GERD.  PT A&OX4.  No distress noted.  Breathing non-labored.  PT ambulatory, able to move all extremities.  Denies pain/ discomfort at this time.  Will continue to monitor.

## 2023-05-11 NOTE — ED PROVIDER NOTE - CLINICAL SUMMARY MEDICAL DECISION MAKING FREE TEXT BOX
[age]-year-old male with a history of PVCs presents with 4 days of cough, congestion, tonsillitis.  Patient was prescribed amoxicillin on Monday for tonsillitis but his cough has progressed.  Likely viral syndrome.  Flu with COVID sent.  Prescription for Hycodan and Tessalon Perles sent to patient's pharmacy.  Dispo to home with PMD follow-up.

## 2023-05-11 NOTE — ED PROVIDER NOTE - NSICDXPASTMEDICALHX_GEN_ALL_CORE_FT
PAST MEDICAL HISTORY:  GERD (gastroesophageal reflux disease)     PVC (premature ventricular contraction)

## 2023-05-11 NOTE — ED PROVIDER NOTE - ATTENDING CONTRIBUTION TO CARE
Dr. Osorio:  I have personally performed a face to face bedside history and physical examination of this patient. I have discussed the history, examination, review of systems, assessment and plan of management with the resident. I have reviewed the electronic medical record and amended it to reflect my history, review of systems, physical exam, assessment and plan.    22M h/o PVCs presents with 4 days of cough, congestion.  Was diagnosed with tonsilitis a few days ago and started on amoxicillin; sore throat improved but cough worsening.  ROS otherwise negative.    Exam:  - nad  - rrr  - ctab   -abd soft ntnd    A/P  - likely viral syndrome  - recommend supportive care, f/u PCP

## 2023-05-11 NOTE — ED PROVIDER NOTE - PATIENT PORTAL LINK FT
You can access the FollowMyHealth Patient Portal offered by [facility] by registering at the following website: [email or web address]. By joining [facility]’s FollowMyHealth portal, you will also be able to view your health information using other applications (apps) compatible with our system.

## 2023-05-11 NOTE — ED POST DISCHARGE NOTE - ADDITIONAL DOCUMENTATION
Pritesh PGY-3: patient called asking for viral swab results. Informed of negative flu/covid/rsv swab. Also states that his codein rx to pharmacy was not able to be filled. Recommended that patient try tessalon perles that were also rx'd as well as salt water gargles and honey as supportive care. PMD f/u.

## 2023-05-11 NOTE — ED ADULT NURSE NOTE - NSFALLUNIVINTERV_ED_ALL_ED
Bed/Stretcher in lowest position, wheels locked, appropriate side rails in place/Call bell, personal items and telephone in reach/Instruct patient to call for assistance before getting out of bed/chair/stretcher/Non-slip footwear applied when patient is off stretcher/[place] to call system/Physically safe environment - no spills, clutter or unnecessary equipment/Purposeful proactive rounding/Room/bathroom lighting operational, light cord in reach

## 2023-05-11 NOTE — ED PROVIDER NOTE - NSFOLLOWUPINSTRUCTIONS_ED_ALL_ED_FT
Please use Tessalon Perles 3 times a day for your cough symptoms.  Please use Hycodan syrup at nighttime for cough and to help you sleep.  The Hycodan syrup will make you sleepy so please do not operate any motor vehicles or heavy machinery while taking this medication.    Please follow up with your primary care physician within 1 week of discharge from the emergency department.  Please bring a copy of your results with you.  Please return to the emergency department for worsening of your symptoms.    You may take Acetaminophen over the counter as needed for pain and/or fever. Use as directed and see medication warnings.  You may take Ibuprofen over the counter as needed for pain and/or fever. Use as directed and see medication warnings.    DISCHARGE INSTRUCTIONS:  Call your local emergency number (911 in the [place]) or have someone else call if:  You have a seizure.  You cannot be woken.  You have chest pain or trouble breathing.    Seek care immediately if:  You have a stiff neck, a bad headache, and sensitivity to light.  You feel weak, dizzy, or confused.  You stop urinating or urinate a lot less than usual.  You cough up blood or thick yellow or green mucus.  You have severe abdominal pain or your abdomen is larger than usual.    Call your primary doctor if:  Your symptoms get worse after 3 days.  You have a rash or ear pain.  You have burning when you urinate.  You have questions or concerns about your condition or care.

## 2023-05-11 NOTE — ED PROVIDER NOTE - PHYSICAL EXAMINATION
GENERAL: well appearing in no acute distress, non-toxic appearing  HEAD: normocephalic, atraumatic  HEENT: normal conjunctiva, oral mucosa moist, uvula midline, no tonsilar exudates  CARDIAC: regular rate and rhythm, normal S1S2, no appreciable murmurs  PULM: normal breath sounds, clear to ascultation bilaterally, no rales, rhonchi, wheezing  GI: abdomen nondistended, soft, nontender, no guarding, rebound tenderness  : no CVA tenderness b/l, no suprapubic tenderness  NEURO: moving all 4 extremities, no focal deficits, normal speech, AAOx3   MSK: no peripheral edema, no calf tenderness b/l  SKIN: well-perfused, extremities warm, no visible rashes  PSYCH: appropriate mood and affect

## 2023-05-11 NOTE — ED ADULT TRIAGE NOTE - CHIEF COMPLAINT QUOTE
Pt c/o sore throat, cough and chest pain after coughing X 3 days. Diagnosed with tonsilitis on 5/8, started on Amoxicillin. Denies chest pain, sob, abd pain, n/v/d, fevers/chills. Pmhx: PVC's, GERD

## 2023-08-10 ENCOUNTER — EMERGENCY (EMERGENCY)
Facility: HOSPITAL | Age: 23
LOS: 1 days | Discharge: HOME | End: 2023-08-10
Attending: STUDENT IN AN ORGANIZED HEALTH CARE EDUCATION/TRAINING PROGRAM | Admitting: STUDENT IN AN ORGANIZED HEALTH CARE EDUCATION/TRAINING PROGRAM
Payer: MEDICAID

## 2023-08-10 VITALS
HEART RATE: 100 BPM | TEMPERATURE: 99 F | DIASTOLIC BLOOD PRESSURE: 85 MMHG | OXYGEN SATURATION: 100 % | RESPIRATION RATE: 20 BRPM | SYSTOLIC BLOOD PRESSURE: 129 MMHG

## 2023-08-10 LAB
ALBUMIN SERPL ELPH-MCNC: 4.2 G/DL — SIGNIFICANT CHANGE UP (ref 3.3–5)
ALP SERPL-CCNC: 76 U/L — SIGNIFICANT CHANGE UP (ref 40–120)
ALT FLD-CCNC: 19 U/L — SIGNIFICANT CHANGE UP (ref 4–41)
ANION GAP SERPL CALC-SCNC: 13 MMOL/L — SIGNIFICANT CHANGE UP (ref 7–14)
AST SERPL-CCNC: 21 U/L — SIGNIFICANT CHANGE UP (ref 4–40)
BASOPHILS # BLD AUTO: 0.01 K/UL — SIGNIFICANT CHANGE UP (ref 0–0.2)
BASOPHILS NFR BLD AUTO: 0.1 % — SIGNIFICANT CHANGE UP (ref 0–2)
BILIRUB SERPL-MCNC: 0.5 MG/DL — SIGNIFICANT CHANGE UP (ref 0.2–1.2)
BUN SERPL-MCNC: 9 MG/DL — SIGNIFICANT CHANGE UP (ref 7–23)
CALCIUM SERPL-MCNC: 9.1 MG/DL — SIGNIFICANT CHANGE UP (ref 8.4–10.5)
CHLORIDE SERPL-SCNC: 101 MMOL/L — SIGNIFICANT CHANGE UP (ref 98–107)
CO2 SERPL-SCNC: 22 MMOL/L — SIGNIFICANT CHANGE UP (ref 22–31)
CREAT SERPL-MCNC: 0.92 MG/DL — SIGNIFICANT CHANGE UP (ref 0.5–1.3)
EGFR: 120 ML/MIN/1.73M2 — SIGNIFICANT CHANGE UP
EOSINOPHIL # BLD AUTO: 0.04 K/UL — SIGNIFICANT CHANGE UP (ref 0–0.5)
EOSINOPHIL NFR BLD AUTO: 0.4 % — SIGNIFICANT CHANGE UP (ref 0–6)
GLUCOSE SERPL-MCNC: 110 MG/DL — HIGH (ref 70–99)
HCT VFR BLD CALC: 46.1 % — SIGNIFICANT CHANGE UP (ref 39–50)
HGB BLD-MCNC: 15.2 G/DL — SIGNIFICANT CHANGE UP (ref 13–17)
IANC: 6.57 K/UL — SIGNIFICANT CHANGE UP (ref 1.8–7.4)
IMM GRANULOCYTES NFR BLD AUTO: 0.2 % — SIGNIFICANT CHANGE UP (ref 0–0.9)
LYMPHOCYTES # BLD AUTO: 1.71 K/UL — SIGNIFICANT CHANGE UP (ref 1–3.3)
LYMPHOCYTES # BLD AUTO: 18.1 % — SIGNIFICANT CHANGE UP (ref 13–44)
MAGNESIUM SERPL-MCNC: 2.3 MG/DL — SIGNIFICANT CHANGE UP (ref 1.6–2.6)
MCHC RBC-ENTMCNC: 26.9 PG — LOW (ref 27–34)
MCHC RBC-ENTMCNC: 33 GM/DL — SIGNIFICANT CHANGE UP (ref 32–36)
MCV RBC AUTO: 81.4 FL — SIGNIFICANT CHANGE UP (ref 80–100)
MONOCYTES # BLD AUTO: 1.09 K/UL — HIGH (ref 0–0.9)
MONOCYTES NFR BLD AUTO: 11.5 % — SIGNIFICANT CHANGE UP (ref 2–14)
NEUTROPHILS # BLD AUTO: 6.57 K/UL — SIGNIFICANT CHANGE UP (ref 1.8–7.4)
NEUTROPHILS NFR BLD AUTO: 69.7 % — SIGNIFICANT CHANGE UP (ref 43–77)
NRBC # BLD: 0 /100 WBCS — SIGNIFICANT CHANGE UP (ref 0–0)
NRBC # FLD: 0 K/UL — SIGNIFICANT CHANGE UP (ref 0–0)
PHOSPHATE SERPL-MCNC: 3.2 MG/DL — SIGNIFICANT CHANGE UP (ref 2.5–4.5)
PLATELET # BLD AUTO: 218 K/UL — SIGNIFICANT CHANGE UP (ref 150–400)
POTASSIUM SERPL-MCNC: 4 MMOL/L — SIGNIFICANT CHANGE UP (ref 3.5–5.3)
POTASSIUM SERPL-SCNC: 4 MMOL/L — SIGNIFICANT CHANGE UP (ref 3.5–5.3)
PROT SERPL-MCNC: 7.8 G/DL — SIGNIFICANT CHANGE UP (ref 6–8.3)
RBC # BLD: 5.66 M/UL — SIGNIFICANT CHANGE UP (ref 4.2–5.8)
RBC # FLD: 12.5 % — SIGNIFICANT CHANGE UP (ref 10.3–14.5)
SODIUM SERPL-SCNC: 136 MMOL/L — SIGNIFICANT CHANGE UP (ref 135–145)
TSH SERPL-MCNC: 1.3 UIU/ML — SIGNIFICANT CHANGE UP (ref 0.27–4.2)
WBC # BLD: 9.44 K/UL — SIGNIFICANT CHANGE UP (ref 3.8–10.5)
WBC # FLD AUTO: 9.44 K/UL — SIGNIFICANT CHANGE UP (ref 3.8–10.5)

## 2023-08-10 PROCEDURE — 93010 ELECTROCARDIOGRAM REPORT: CPT

## 2023-08-10 PROCEDURE — 99284 EMERGENCY DEPT VISIT MOD MDM: CPT

## 2023-08-10 RX ORDER — SODIUM CHLORIDE 9 MG/ML
1000 INJECTION INTRAMUSCULAR; INTRAVENOUS; SUBCUTANEOUS ONCE
Refills: 0 | Status: COMPLETED | OUTPATIENT
Start: 2023-08-10 | End: 2023-08-10

## 2023-08-10 RX ORDER — ONDANSETRON 8 MG/1
1 TABLET, FILM COATED ORAL
Qty: 1 | Refills: 0
Start: 2023-08-10

## 2023-08-10 RX ORDER — KETOROLAC TROMETHAMINE 30 MG/ML
15 SYRINGE (ML) INJECTION ONCE
Refills: 0 | Status: DISCONTINUED | OUTPATIENT
Start: 2023-08-10 | End: 2023-08-10

## 2023-08-10 RX ORDER — FAMOTIDINE 10 MG/ML
20 INJECTION INTRAVENOUS ONCE
Refills: 0 | Status: COMPLETED | OUTPATIENT
Start: 2023-08-10 | End: 2023-08-10

## 2023-08-10 RX ORDER — ONDANSETRON 8 MG/1
4 TABLET, FILM COATED ORAL ONCE
Refills: 0 | Status: COMPLETED | OUTPATIENT
Start: 2023-08-10 | End: 2023-08-10

## 2023-08-10 RX ADMIN — FAMOTIDINE 20 MILLIGRAM(S): 10 INJECTION INTRAVENOUS at 12:55

## 2023-08-10 RX ADMIN — Medication 15 MILLIGRAM(S): at 12:55

## 2023-08-10 RX ADMIN — SODIUM CHLORIDE 1000 MILLILITER(S): 9 INJECTION INTRAMUSCULAR; INTRAVENOUS; SUBCUTANEOUS at 12:55

## 2023-08-10 RX ADMIN — ONDANSETRON 4 MILLIGRAM(S): 8 TABLET, FILM COATED ORAL at 12:55

## 2023-08-10 NOTE — ED PROVIDER NOTE - OBJECTIVE STATEMENT
Procedure Note     Patient: Brando Whitley     Pre-op Dx: avulsion fracture of the navicular, right foot     Post-op Dx: same     Procedure: Right percutaneous repair of navicular avulsion fracture     Surgeon:  Phuc Fairchild DPM     Assistants: Wilmer Rubio     Anesthesia Staff: Anesthesiologist: Guru Whatley MD  Anesthesia Staff: Alberto Romano     Anesthesia Type: mac     Findings: consistent with diagnosis     Estimated Blood Loss: minimal     Complications: none     Specimens Removed: none    Patient was brought back to the operating room and placed on the OR table in supine position, after the induction of IV sedation the site was blocked with 0.5% marcaine in the form of a field block. The foot was prepped and draped. Following the site was exsanguinated using an esmarch bandaid. Under fluoroscopy a guidepin was placed across the navicular avulsion fracture site and into the navicular. Next an arthrex 4.0 cannulated screw was thrown across the site and compression was inspected under fluoroscopy. Being satisfied the site was irrigated and closed using 3-0 vicryl suture. A dressing of 4x4, kerlix and coban was applied to the right foot. Tourniquet was released and immediate warmth and perfusion returned to the right foot. Patient was transferred from the OR to the PACU with VSS and NVSI to the right foot. He will follow up in 7 days.   23-year-old male with past medical history of PVCs, on diltiazem for, COVID-positive yesterday presenting for nausea vomiting generalized body aches.  Patient states symptoms started on Monday, initially congestion, now also has a dry cough.  No urinary symptoms.  No abdominal pain.  States that he does have gastritis, and occasionally vomits secondary to that as well.  No other complaints at this time

## 2023-08-10 NOTE — ED PROVIDER NOTE - CHIEF COMPLAINT
The patient is a 23y Male complaining of  Adjustment disorder with mixed disturbance of emotions and conduct  Major Depressive Disorder, moderate recurrent   Borderline Personality Disorder

## 2023-08-10 NOTE — ED PROVIDER NOTE - CLINICAL SUMMARY MEDICAL DECISION MAKING FREE TEXT BOX
23-year-old male presenting with flulike symptoms secondary to COVID.  Tested positive yesterday.  On exam mild tachycardia.  No reproducible abdominal or CVA tenderness bilaterally.  Otherwise unremarkable exam.  Plan for labs to assess electrolytes, plan for hydration along with antiemetics and pain medicine.  Reassess to dispo. Elsy Cooper, ED Attending

## 2023-08-10 NOTE — ED ADULT NURSE NOTE - OBJECTIVE STATEMENT
Pt received to intake presents with palpitations, dizziness and shortness of breath since returning from [facility], pt reports was diagnosed with covid yesterday. Pt a&ox4, ambulatory at baseline, skin intact, respirations even and unlabored, abd soft and non-distended, non tender to palpation. 20g IV placed in rt arm, labs drawn and sent, pt medicated as per ordered, will continue to monitor.

## 2023-08-10 NOTE — ED ADULT NURSE NOTE - CHIEF COMPLAINT QUOTE
Pt states diagnosed with COVID yesterday. Returned from [place] on Wednesday. Reporting worsening dizziness, palpitations, SOB, lower back and abd pain, and nausea vomting. Unable to tolerate PO intake. PMHx- sleep apnea and PVCs

## 2023-08-10 NOTE — ED PROVIDER NOTE - PATIENT PORTAL LINK FT
You can access the FollowMyHealth Patient Portal offered by [facility] by registering at the following website: [email or web address]. By joining [employer]’s FollowMyHealth portal, you will also be able to view your health information using other applications (apps) compatible with our system.

## 2023-08-10 NOTE — ED ADULT TRIAGE NOTE - CHIEF COMPLAINT QUOTE
Pt states diagnosed with COVID yesterday. Returned from [place] on Wednesday. Reporting worsening dizziness, palpitations, SOB, lower back and abd pain, and nausea vomting. Unable to tolerate PO intake. PMHx- sleep apnea and PVCs

## 2023-08-10 NOTE — ED PROVIDER NOTE - PROGRESS NOTE DETAILS
Patient feels much better, vital signs improved.  Will give return precautions and follow-up instructions with teach back.  Plan for DC. Elsy Cooper, ED Attending

## 2023-08-10 NOTE — ED ADULT TRIAGE NOTE - NS ED NURSE BANDS TYPE
June 25, 2019       Felton Coronado MD  [address] [address]  VIA In Basket      Patient: Hemalatha Barber   YOB: 1953   Date of Visit: 6/25/2019       Dear Dr. Coronado:    Thank you for referring Hemalatha Barber to me for evaluation. Below are my notes for this visit with her.    If you have questions, please do not hesitate to call me. I look forward to following your patient along with you.      Sincerely,        Jm Francisco MD        CC: No Recipients               Name band;

## 2023-08-10 NOTE — ED ADULT TRIAGE NOTE - TEMPERATURE IN CELSIUS (DEGREES C)
Called pcp office and spoke to Conchis and advised fax over most recent height and weight. She advised it was done in October for 9 mo wellness visit and will fax over to [phone].    37.2

## 2023-08-10 NOTE — ED PROVIDER NOTE - NSFOLLOWUPINSTRUCTIONS_ED_ALL_ED_FT
Viral Illness, Adult    Viruses are tiny germs that can get into a person's body and cause illness. There are many different types of viruses, and they cause many types of illness. Viral illnesses can range from mild to severe. They can affect various parts of the body.    Short-term conditions that are caused by a virus include colds and the flu (influenza). Long-term conditions that are caused by a virus include herpes, shingles, and HIV (human immunodeficiency virus) infection. A few viruses have been linked to certain cancers.    What are the causes?  Many types of viruses can cause illness. Viruses invade cells in your body, multiply, and cause the infected cells to work abnormally or die. When these cells die, they release more of the virus. When this happens, you develop symptoms of the illness, and the virus continues to spread to other cells. If the virus takes over the function of the cell, it can cause the cell to divide and grow out of control. This happens when a virus causes cancer.    Different viruses get into the body in different ways. You can get a virus by:  Swallowing food or water that has come in contact with the virus (is contaminated).  Breathing in droplets that have been coughed or sneezed into the air by an infected person.  Touching a surface that has been contaminated with the virus and then touching your eyes, nose, or mouth.  Being bitten by an insect or animal that carries the virus.  Having sexual contact with a person who is infected with the virus.  Being exposed to blood or fluids that contain the virus, either through an open cut or during a transfusion.  If a virus enters your body, your body's defense system (immune system) will try to fight the virus. You may be at higher risk for a viral illness if your immune system is weak.    What are the signs or symptoms?  You may have these symptoms, depending on the type of virus and the location of the cells that it invades:  Cold and flu viruses:  Fever.  Headache.  Sore throat.  Muscle aches.  Stuffy nose (nasal congestion).  Cough.  Digestive system (gastrointestinal) viruses:  Fever.  Pain in the abdomen.  Nausea.  Diarrhea.  Liver viruses (hepatitis):  Loss of appetite.  Tiredness.  Skin or the white parts of your eyes turning yellow (jaundice).  Brain and spinal cord viruses:  Fever.  Headache.  Stiff neck.  Nausea and vomiting.  Confusion or sleepiness.  Skin viruses:  Warts.  Itching.  Rash.  Sexually transmitted viruses:  Discharge.  Swelling.  Redness.  Rash.  How is this diagnosed?  This condition may be diagnosed based on one or more of the following:  Symptoms.  Medical history.  Physical exam.  Blood test, sample of mucus from your lungs (sputum sample), stool sample, or a swab of body fluids or a skin sore (lesion).  How is this treated?  Viruses can be hard to treat because they live within cells. Antibiotic medicines do not treat viruses because these medicines do not get inside cells. Treatment for a viral illness may include:  Resting and drinking plenty of fluids.  Medicines to relieve symptoms. These can include over-the-counter medicine for pain and fever, medicines for cough or congestion, and medicines to relieve diarrhea.  Antiviral medicines. These medicines are available only for certain types of viruses.  Some viral illnesses can be prevented with vaccinations. A common example is the flu shot.    Follow these instructions at home:  Medicines    Take over-the-counter and prescription medicines only as told by your health care provider.  If you were prescribed an antiviral medicine, take it as told by your health care provider. Do not stop taking the antiviral even if you start to feel better.  Be aware of when antibiotics are needed and when they are not needed. Antibiotics do not treat viruses. You may get an antibiotic if your health care provider thinks that you may have, or are at risk for, a bacterial infection and you have a viral infection.  Do not ask for an antibiotic prescription if you have been diagnosed with a viral illness. Antibiotics will not make your illness go away faster.  Frequently taking antibiotics when they are not needed can lead to antibiotic resistance. When this develops, the medicine no longer works against the bacteria that it normally fights.  General instructions      Drink enough fluids to keep your urine pale yellow.  Rest as much as possible.  Return to your normal activities as told by your health care provider. Ask your health care provider what activities are safe for you.  Keep all follow-up visits as told by your health care provider. This is important.  How is this prevented?    To reduce your risk of viral illness:  Wash your hands often with soap and water for at least 20 seconds. If soap and water are not available, use hand .  Avoid touching your nose, eyes, and mouth, especially if you have not washed your hands recently.  If anyone in your household has a viral infection, clean all household surfaces that may have been in contact with the virus. Use soap and hot water. You may also use bleach that you have added water to (diluted).  Stay away from people who are sick with symptoms of a viral infection.  Do not share items such as toothbrushes and water bottles with other people.  Keep your vaccinations up to date. This includes getting a yearly flu shot.  Eat a healthy diet and get plenty of rest.  Contact a health care provider if:  You have symptoms of a viral illness that do not go away.  Your symptoms come back after going away.  Your symptoms get worse.  Get help right away if you have:  Trouble breathing.  A severe headache or a stiff neck.  Severe vomiting or pain in your abdomen.  These symptoms may represent a serious problem that is an emergency. Do not wait to see if the symptoms will go away. Get medical help right away. Call your local emergency services (911 in the [place] Do not drive yourself to the hospital.    Summary  Viruses are types of germs that can get into a person's body and cause illness. Viral illnesses can range from mild to severe. They can affect various parts of the body.  Viruses can be hard to treat. There are medicines to relieve symptoms, and there are some antiviral medicines.  If you were prescribed an antiviral medicine, take it as told by your health care provider. Do not stop taking the antiviral even if you start to feel better.  Contact a health care provider if you have symptoms of a viral illness that do not go away.  This information is not intended to replace advice given to you by your health care provider. Make sure you discuss any questions you have with your health care provider.

## 2023-08-10 NOTE — ED ADULT NURSE NOTE - NSFALLUNIVINTERV_ED_ALL_ED
Bed/Stretcher in lowest position, wheels locked, appropriate side rails in place/Call bell, personal items and telephone in reach/Instruct patient to call for assistance before getting out of bed/chair/stretcher/Non-slip footwear applied when patient is off stretcher/[place] to call system/Physically safe environment - no spills, clutter or unnecessary equipment/Purposeful proactive rounding/Room/bathroom lighting operational, light cord in reach

## 2023-12-28 ENCOUNTER — EMERGENCY (EMERGENCY)
Facility: HOSPITAL | Age: 23
LOS: 1 days | Discharge: LEFT BEFORE DISCHARGE | End: 2023-12-28
Admitting: EMERGENCY MEDICINE
Payer: MEDICAID

## 2023-12-28 VITALS
SYSTOLIC BLOOD PRESSURE: 109 MMHG | HEART RATE: 116 BPM | TEMPERATURE: 101 F | OXYGEN SATURATION: 100 % | DIASTOLIC BLOOD PRESSURE: 85 MMHG | RESPIRATION RATE: 20 BRPM

## 2023-12-28 PROCEDURE — L9991: CPT

## 2023-12-28 NOTE — ED ADULT NURSE NOTE - CHIEF COMPLAINT QUOTE
fever    pt has fever (tmax 102.9), body aches, chest pain, sore throat.  went to urgent care neg for strep, covid and fluid.  past medical history- pvc

## 2023-12-28 NOTE — ED ADULT TRIAGE NOTE - CHIEF COMPLAINT QUOTE
fever    pt has fever (tmax 102.9), body aches, chest pain, sore throat.  went to urgent care neg for strep, covid and fluid.  past medical history- pvd fever    pt has fever (tmax 102.9), body aches, chest pain, sore throat.  went to urgent care neg for strep, covid and fluid.  past medical history- pvc

## 2024-07-11 NOTE — ED ADULT NURSE NOTE - CAS EDN DISCHARGE ASSESSMENT
Patient transported to X-ray     Sarah Cuevas RN  07/10/24 [id number]     Alert and oriented to person, place and time/ambulatory, in NAD, appears comfortable